# Patient Record
Sex: FEMALE | Race: OTHER | HISPANIC OR LATINO | Employment: UNEMPLOYED | URBAN - METROPOLITAN AREA
[De-identification: names, ages, dates, MRNs, and addresses within clinical notes are randomized per-mention and may not be internally consistent; named-entity substitution may affect disease eponyms.]

---

## 2019-01-05 ENCOUNTER — HOSPITAL ENCOUNTER (EMERGENCY)
Facility: HOSPITAL | Age: 19
Discharge: HOME | End: 2019-01-05
Attending: EMERGENCY MEDICINE
Payer: COMMERCIAL

## 2019-01-05 VITALS
RESPIRATION RATE: 16 BRPM | BODY MASS INDEX: 40.05 KG/M2 | SYSTOLIC BLOOD PRESSURE: 124 MMHG | WEIGHT: 204 LBS | HEIGHT: 60 IN | DIASTOLIC BLOOD PRESSURE: 68 MMHG | HEART RATE: 95 BPM | OXYGEN SATURATION: 99 % | TEMPERATURE: 97.1 F

## 2019-01-05 DIAGNOSIS — N39.0 UTI (URINARY TRACT INFECTION): Primary | ICD-10-CM

## 2019-01-05 LAB
BACTERIA UR QL AUTO: ABNORMAL /HPF
BILIRUB UR QL STRIP: NEGATIVE
CLARITY UR: CLEAR
COLOR UR: YELLOW
EXT PREG TEST URINE: NORMAL
GLUCOSE UR STRIP-MCNC: NEGATIVE MG/DL
HGB UR QL STRIP.AUTO: ABNORMAL
KETONES UR STRIP-MCNC: NEGATIVE MG/DL
LEUKOCYTE ESTERASE UR QL STRIP: ABNORMAL
NITRITE UR QL STRIP: NEGATIVE
NON-SQ EPI CELLS URNS QL MICRO: ABNORMAL /HPF
PH UR STRIP.AUTO: 6.5 [PH] (ref 4.5–8)
PROT UR STRIP-MCNC: ABNORMAL MG/DL
RBC #/AREA URNS AUTO: ABNORMAL /HPF
SP GR UR STRIP.AUTO: 1.01 (ref 1–1.03)
UROBILINOGEN UR QL STRIP.AUTO: 0.2 E.U./DL
WBC #/AREA URNS AUTO: ABNORMAL /HPF

## 2019-01-05 PROCEDURE — 87186 SC STD MICRODIL/AGAR DIL: CPT

## 2019-01-05 PROCEDURE — 87147 CULTURE TYPE IMMUNOLOGIC: CPT

## 2019-01-05 PROCEDURE — 81025 URINE PREGNANCY TEST: CPT | Performed by: EMERGENCY MEDICINE

## 2019-01-05 PROCEDURE — 87077 CULTURE AEROBIC IDENTIFY: CPT

## 2019-01-05 PROCEDURE — 87086 URINE CULTURE/COLONY COUNT: CPT

## 2019-01-05 PROCEDURE — 81001 URINALYSIS AUTO W/SCOPE: CPT

## 2019-01-05 PROCEDURE — 99283 EMERGENCY DEPT VISIT LOW MDM: CPT

## 2019-01-05 RX ORDER — CEPHALEXIN 500 MG/1
500 CAPSULE ORAL EVERY 12 HOURS SCHEDULED
Qty: 14 CAPSULE | Refills: 0 | Status: SHIPPED | OUTPATIENT
Start: 2019-01-05 | End: 2019-01-12

## 2019-01-05 NOTE — DISCHARGE INSTRUCTIONS
Infección del tracto urinario en mujeres   LO QUE NECESITA SABER:   Katerine infección en el tracto urinario (ITU) ocurre cuando entran bacterias en plasencia tracto urinario  La mayoría de las bacterias que entran al tracto urinario salen al Misti Pong  Si la bacteria permanece en el tracto urinario, usted podría contraer Pitney You  Plasencia tracto urinario incluye leopoldo riñones, uréteres, vejiga y Gondregnies  La orina es producida en los riñones y fluye del uréter a la vejiga  La orina sale de la vejiga a través de la uretra  Katerine infección del tracto urinario es más común in [place] parte inferior de plasencia tracto urinario que incluye plasencia vejiga y uretra  INSTRUCCIONES SOBRE EL VALENTIN HOSPITALARIA:   Regrese a la lilian de emergencias si:   · Usted está orinando muy poco o nada en absoluto  · Usted tiene fiebre valentin con temblor y escalofríos  · Usted tiene dolor en el costado o en la espalda que FORTUNATO  Pregúntele a plasencia Taisha Kate vitaminas y minerales son adecuados para usted  · Usted tiene fiebre  · Usted no siente mejoría después de 2 días de marce los antibióticos  · Usted esta vomitando  · Usted tiene preguntas o inquietudes acerca de plasencia condición o cuidado  Medicamentos:   · Antibióticos  ayudan a combatir katerine infección bacterial      · Medicamentos,  para disminuir el dolor y el ardor al orinar  También ayudarán a disminuir la sensación de necesitar orinar con frecuencia  Estos medicamentos harán que orine de color anaranjado o tate  · [place] leopoldo medicamentos bria se le haya indicado  Consulte con plasencia médico si usted migdalia que plasencia medicamento no le está ayudando o si presenta efectos secundarios  Infórmele si es alérgico a cualquier medicamento  Mantenga katerine lista actualizada de los [place], las vitaminas y los productos herbales que edvin  Incluya los siguientes datos de los medicamentos: cantidad, frecuencia y motivo de administración   Traiga con usted la lista o los envases de la píldoras a leopoldo citas de seguimiento  Lleve la lista de los medicamentos con usted en carmelo de dylon emergencia  Acuda a leopoldo consultas de control con plasencia médico según le indicaron  Anote leopoldo preguntas para que se acuerde de hacerlas cheikh leopoldo visitas  Evite otra ITU:   · Vacíe la vejiga con frecuencia  Orine y vacíe la vejiga tan pronto bria usted sienta la necesidad  No retenga la orina por largos períodos de [place]  · Límpiese de adelante hacia atrás después de orinar o de tener dylon evacuación intestinal   [place] ayudará a evitar que los gérmenes entren en el tracto urinario a través de la uretra  · [address] se le haya indicado  Pregunte cuánto líquido debe marce cada día y cuáles líquidos son los más adecuados para usted  Es probable que usted necesite marce más líquidos de lo habitual para ayudar a deshacerse de la bacteria  No tome alcohol, cafeína ni jugos cítricos  Estos pueden irritar plasencia vejiga y aumentar leopoldo síntomas  Plasencia médico puede recomendarle el jugo de arándano para prevenir dylon infección [place]  · Orine después de [employer]  [place] puede ayudar a eliminar las bacterias que pasan cheikh el sexo  · No tome duchas vaginales ni use desodorantes femeninos  Estos pueden cambiar el equilibrio químico de la vagina  · Cambie las compresas o los tampones a menudo  [place] ayudará a evitar que los gérmenes entren en el tracto urinario  · Realice ejercicios para el piso pélvico con frecuencia  Los músculos pélvicos ayudan a empezar y parar de orinar  Los músculos pélvicos kory ayudan a vaciar la vejiga más fácilmente  Apriete estos músculos firmemente cheikh 5 segundos bria si estuviera tratando de retener el flujo de [facility]  Luego relaje por 5 segundos  Aumente gradualmente a 10 segundos  Carmine 3 series de 15 repeticiones al día o bria se le indique    © 2017 [address] [employer] Information is for End User's use only and may not be sold, redistributed or otherwise used for commercial purposes  All illustrations and images included in CareNotes® are the copyrighted property of A D A M , Inc  or Johnny Dooley  Esta información es sólo para uso en educación  Meyer intención no es darle un consejo médico sobre enfermedades o tratamientos  Colsulte con meyer Escobar Hall farmacéutico antes de seguir cualquier régimen médico para saber si es seguro y efectivo para usted

## 2019-01-05 NOTE — ED PROVIDER NOTES
History  Chief Complaint   Patient presents with    Blood in Urine     pt to ER c/o blood in her urine  +burning with urination  Pt taking [facility] urine infection medication without relief  Symptoms started a few days ago       Urinary Frequency   Location:  Suprapubic  Quality:  Burning, frequency, hematuria  Severity:  Mild  Onset quality:  Gradual  Duration:  3 days  Timing:  Constant  Progression:  Unchanged  Chronicity:  New  Relieved by:  AZO  Worsened by:  Nothing  Associated symptoms: abdominal pain    Associated symptoms: no chest pain, no congestion, no cough, no ear pain, no fever, no headaches, no nausea, no shortness of breath, no sore throat, no vomiting and no wheezing        None       History reviewed  No pertinent past medical history  History reviewed  No pertinent surgical history  History reviewed  No pertinent family history  I have reviewed and agree with the history as documented  Social History   Substance Use Topics    Smoking status: Never Smoker    Smokeless tobacco: Never Used    Alcohol use No        Review of Systems   Constitutional: Negative for activity change, appetite change, chills and fever  HENT: Negative for congestion, ear pain and sore throat  Respiratory: Negative for cough, shortness of breath and wheezing  Cardiovascular: Negative for chest pain  Gastrointestinal: Positive for abdominal pain  Negative for nausea and vomiting  Genitourinary: Positive for dysuria and frequency  Negative for decreased urine volume, difficulty urinating, dyspareunia, flank pain, pelvic pain, urgency, vaginal bleeding, vaginal discharge and vaginal pain  Neurological: Negative for headaches  All other systems reviewed and are negative  Physical Exam  Physical Exam   Constitutional: She is oriented to person, place, and time  She appears well-developed and well-nourished  No distress  HENT:   Head: Normocephalic and atraumatic     Right Ear: External ear normal    Left Ear: External ear normal    Nose: Nose normal    Mouth/Throat: Oropharynx is clear and moist    Eyes: Pupils are equal, round, and reactive to light  Conjunctivae and EOM are normal  No scleral icterus  Neck: Normal range of motion  Neck supple  Cardiovascular: Normal rate, regular rhythm, normal heart sounds and intact distal pulses  Exam reveals no gallop and no friction rub  No murmur heard  Pulmonary/Chest: Effort normal and breath sounds normal  No respiratory distress  She has no wheezes  Abdominal: Soft  Bowel sounds are normal  She exhibits no distension  There is tenderness in the suprapubic area  There is no rigidity, no rebound, no guarding and no CVA tenderness  Musculoskeletal: Normal range of motion  She exhibits no edema, tenderness or deformity  Lymphadenopathy:     She has no cervical adenopathy  Neurological: She is alert and oriented to person, place, and time  Skin: Skin is warm and dry  Capillary refill takes less than 2 seconds  No rash noted  She is not diaphoretic  No erythema  Nursing note and vitals reviewed        Vital Signs  ED Triage Vitals [01/05/19 1740]   Temperature Pulse Respirations Blood Pressure SpO2   (!) 97 1 °F (36 2 °C) 95 16 124/68 99 %      Temp Source Heart Rate Source Patient Position - Orthostatic VS BP Location FiO2 (%)   Temporal Monitor Sitting Right arm --      Pain Score       Worst Possible Pain           Vitals:    01/05/19 1740   BP: 124/68   Pulse: 95   Patient Position - Orthostatic VS: Sitting       Visual Acuity      ED Medications  Medications - No data to display    Diagnostic Studies  Results Reviewed     Procedure Component Value Units Date/Time    Urine Microscopic [[id number]]  (Abnormal) Collected:  01/05/19 1808    Lab Status:  Final result Specimen:  Urine from Urine, Clean Catch Updated:  01/05/19 1818     RBC, UA Innumerable (A) /hpf      WBC, UA Innumerable (A) /hpf      Epithelial Cells Occasional /hpf Bacteria, UA Occasional /hpf     Urine culture [[id number]] Collected:  01/05/19 1808    Lab Status: In process Specimen:  Urine from Urine, Clean Catch Updated:  01/05/19 1818    POCT pregnancy, urine [[id number]]  (Normal) Resulted:  01/05/19 1753    Lab Status:  Final result Updated:  01/05/19 1753     EXT PREG TEST UR (Ref: Negative) neg    POCT urinalysis dipstick [[id number]]  (Abnormal) Resulted:  01/05/19 1753    Lab Status:  Final result Updated:  01/05/19 1753    ED Urine Macroscopic [[id number]]  (Abnormal) Collected:  01/05/19 1808    Lab Status:  Final result Specimen:  Urine Updated:  01/05/19 1752     Color, UA Yellow     Clarity, UA Clear     pH, UA 6 5     Leukocytes, UA Large (A)     Nitrite, UA Negative     Protein,  (2+) (A) mg/dl      Glucose, UA Negative mg/dl      Ketones, UA Negative mg/dl      Urobilinogen, UA 0 2 E U /dl      Bilirubin, UA Negative     Blood, UA Large (A)     Specific [place], UA 1 015    Narrative:       CLINITEK RESULT                 No orders to display              Procedures  Procedures       Phone Contacts  ED Phone Contact    ED Course                               MDM  Number of Diagnoses or Management Options  UTI (urinary tract infection): new and requires workup  Diagnosis management comments: SUBJECTIVE: Korin Alvarado is a 22 y o  female who complains of urinary frequency, urgency and dysuria x 3 days, without flank pain, fever, chills, or abnormal vaginal discharge or bleeding  OBJECTIVE: Appears well, in no apparent distress  Vital signs are normal  The abdomen is soft with mild suprapubic tenderness; no guarding, mass, rebound or organomegaly  No CVA tenderness or inguinal adenopathy noted  Urine dipstick shows positive for WBC's, positive for RBC's and positive for leukocytes  Micro exam: innumerable WBC's per HPF, innumberable RBC's per HPF and occasional bacteria       ASSESSMENT: UTI uncomplicated without evidence of pyelonephritis    PLAN: Treatment per orders - also push fluids, continue  Pyridium OTC prn  Call or return to ED prn if these symptoms worsen or fail to improve as anticipated  Amount and/or Complexity of Data Reviewed  Clinical lab tests: ordered and reviewed    Risk of Complications, Morbidity, and/or Mortality  Presenting problems: low  Diagnostic procedures: low  Management options: low    Patient Progress  Patient progress: stable    CritCare Time    Disposition  Final diagnoses:   UTI (urinary tract infection)     Time reflects when diagnosis was documented in both MDM as applicable and the Disposition within this note     Time User Action Codes Description Comment    1/5/2019  6:27 PM Adrianna Stewart Add [N39 0] UTI (urinary tract infection)       ED Disposition     ED Disposition Condition Comment    Discharge  Felecia Mcrae discharge to home/self care      Condition at discharge: Stable        Follow-up Information     Follow up With Specialties Details Why [address] [place] [address] Family Medicine   [address] [place] [address]  [phone]  [place] [address] Obstetrics and Gynecology Schedule an appointment as soon as possible for a visit As needed [facility] [phone]  [address], [address], [place], [place], [address] [facility]  [facility] Emergency Medicine  If symptoms worsen Connie Tolentino [address] [place] ED, [address] [facility] , [place], [place], [address]          Patient's Medications   Discharge Prescriptions    CEPHALEXIN (KEFLEX) 500 MG CAPSULE    Take 1 capsule (500 mg total) by mouth every 12 (twelve) hours for 7 days       Start Date: 1/5/2019  End Date: 1/12/2019       Order Dose: 500 mg       Quantity: 14 capsule    Refills: 0     No discharge procedures on file      ED Provider  Electronically Signed by           Hailee Huggins PA-C  01/05/19 6638

## 2019-01-08 LAB
BACTERIA UR CULT: ABNORMAL
BACTERIA UR CULT: ABNORMAL

## 2020-01-04 ENCOUNTER — HOSPITAL ENCOUNTER (EMERGENCY)
Facility: HOSPITAL | Age: 20
Discharge: HOME | End: 2020-01-04
Attending: EMERGENCY MEDICINE | Admitting: EMERGENCY MEDICINE
Payer: COMMERCIAL

## 2020-01-04 VITALS
SYSTOLIC BLOOD PRESSURE: 104 MMHG | RESPIRATION RATE: 18 BRPM | HEART RATE: 86 BPM | WEIGHT: 209.44 LBS | BODY MASS INDEX: 40.9 KG/M2 | OXYGEN SATURATION: 98 % | DIASTOLIC BLOOD PRESSURE: 68 MMHG | TEMPERATURE: 100.1 F

## 2020-01-04 DIAGNOSIS — J10.1 INFLUENZA A: Primary | ICD-10-CM

## 2020-01-04 LAB
BACTERIA UR QL AUTO: ABNORMAL /HPF
BILIRUB UR QL STRIP: NEGATIVE
CLARITY UR: CLEAR
CLARITY, POC: CLEAR
COLOR UR: YELLOW
COLOR, POC: YELLOW
EXT PREG TEST URINE: NEGATIVE
EXT. CONTROL ED NAV: NORMAL
FLUAV RNA NPH QL NAA+PROBE: DETECTED
FLUBV RNA NPH QL NAA+PROBE: ABNORMAL
GLUCOSE UR STRIP-MCNC: NEGATIVE MG/DL
HGB UR QL STRIP.AUTO: ABNORMAL
KETONES UR STRIP-MCNC: NEGATIVE MG/DL
LEUKOCYTE ESTERASE UR QL STRIP: NEGATIVE
NITRITE UR QL STRIP: NEGATIVE
NON-SQ EPI CELLS URNS QL MICRO: ABNORMAL /HPF
PH UR STRIP.AUTO: 6 [PH] (ref 4.5–8)
PROT UR STRIP-MCNC: ABNORMAL MG/DL
RBC #/AREA URNS AUTO: ABNORMAL /HPF
RSV RNA NPH QL NAA+PROBE: ABNORMAL
SP GR UR STRIP.AUTO: 1.02 (ref 1–1.03)
UROBILINOGEN UR QL STRIP.AUTO: 0.2 E.U./DL
WBC #/AREA URNS AUTO: ABNORMAL /HPF

## 2020-01-04 PROCEDURE — 81025 URINE PREGNANCY TEST: CPT | Performed by: EMERGENCY MEDICINE

## 2020-01-04 PROCEDURE — 99283 EMERGENCY DEPT VISIT LOW MDM: CPT | Performed by: NURSE PRACTITIONER

## 2020-01-04 PROCEDURE — 96360 HYDRATION IV INFUSION INIT: CPT

## 2020-01-04 PROCEDURE — 81001 URINALYSIS AUTO W/SCOPE: CPT

## 2020-01-04 PROCEDURE — 99283 EMERGENCY DEPT VISIT LOW MDM: CPT

## 2020-01-04 PROCEDURE — 87631 RESP VIRUS 3-5 TARGETS: CPT | Performed by: NURSE PRACTITIONER

## 2020-01-04 RX ORDER — IBUPROFEN 400 MG/1
400 TABLET ORAL ONCE
Status: COMPLETED | OUTPATIENT
Start: 2020-01-04 | End: 2020-01-04

## 2020-01-04 RX ORDER — ACETAMINOPHEN 325 MG/1
650 TABLET ORAL ONCE
Status: COMPLETED | OUTPATIENT
Start: 2020-01-04 | End: 2020-01-04

## 2020-01-04 RX ORDER — OSELTAMIVIR PHOSPHATE 75 MG/1
75 CAPSULE ORAL EVERY 12 HOURS
Qty: 10 CAPSULE | Refills: 0 | Status: SHIPPED | OUTPATIENT
Start: 2020-01-04 | End: 2020-01-09

## 2020-01-04 RX ADMIN — IBUPROFEN 400 MG: 400 TABLET ORAL at 02:29

## 2020-01-04 RX ADMIN — ACETAMINOPHEN 650 MG: 325 TABLET ORAL at 00:37

## 2020-01-04 RX ADMIN — SODIUM CHLORIDE 1000 ML: 0.9 INJECTION, SOLUTION INTRAVENOUS at 02:39

## 2020-01-04 NOTE — ED PROVIDER NOTES
History  Chief Complaint   Patient presents with    Generalized Body Aches     Generalized bodyaches, fatigue, fevers, weakness, headaches, prior nausea/vomiting  Accompanied by friend who is also sick  No antipyretics pta  This is an obese 20year old [address] speaking female who is here from [address] visiting and states that yesterday developed a fever, body aches, weakness, headaches  She states she had nausea and vomiting earlier  She denies taking anything for her symptoms  She denies getting a flu shot  Her friend is ill as well  History provided by:  Patient and medical records   used: Yes        None       History reviewed  No pertinent past medical history  History reviewed  No pertinent surgical history  History reviewed  No pertinent family history  I have reviewed and agree with the history as documented  Social History     Tobacco Use    Smoking status: Never Smoker    Smokeless tobacco: Never Used   Substance Use Topics    Alcohol use: No    Drug use: No        Review of Systems   Constitutional: Positive for fatigue and fever  HENT: Negative  Eyes: Negative  Respiratory: Positive for cough  Cardiovascular: Negative  Gastrointestinal: Positive for nausea  Endocrine: Negative  Genitourinary: Negative  Musculoskeletal: Positive for myalgias  Skin: Negative  Allergic/Immunologic: Negative  Neurological: Positive for headaches  Hematological: Negative  Psychiatric/Behavioral: Negative  Physical Exam  Physical Exam   Constitutional: She is oriented to person, place, and time  She appears well-developed and well-nourished  She appears distressed  Appears not to feel well but is not toxic appearing     HENT:   Head: Normocephalic  Right Ear: External ear normal    Left Ear: External ear normal    Nose: Nose normal    Mouth/Throat: Oropharynx is clear and moist  No oropharyngeal exudate     Eyes: Pupils are equal, round, and reactive to light  EOM are normal    Neck: Normal range of motion  Neck supple  Cardiovascular: Normal rate, regular rhythm and normal heart sounds  Pulmonary/Chest: Effort normal and breath sounds normal    Abdominal: Soft  Bowel sounds are normal  She exhibits no distension  There is no tenderness  Musculoskeletal: Normal range of motion  Neurological: She is alert and oriented to person, place, and time  Skin: Skin is warm  She is diaphoretic  Psychiatric: She has a normal mood and affect  Her behavior is normal  Judgment and thought content normal    Nursing note and vitals reviewed        Vital Signs  ED Triage Vitals [01/04/20 0034]   Temperature Pulse Respirations Blood Pressure SpO2   (!) 102 8 °F (39 3 °C) (!) 126 18 116/75 96 %      Temp Source Heart Rate Source Patient Position - Orthostatic VS BP Location FiO2 (%)   Oral Monitor Sitting Right arm --      Pain Score       8           Vitals:    01/04/20 0034 01/04/20 0239   BP: 116/75 104/68   Pulse: (!) 126 86   Patient Position - Orthostatic VS: Sitting          Visual Acuity      ED Medications  Medications   acetaminophen (TYLENOL) tablet 650 mg (650 mg Oral Given 1/4/20 0037)   sodium chloride 0 9 % bolus 1,000 mL (0 mL Intravenous Stopped 1/4/20 0332)   ibuprofen (MOTRIN) tablet 400 mg (400 mg Oral Given 1/4/20 0229)       Diagnostic Studies  Results Reviewed     Procedure Component Value Units Date/Time    Influenza A/B and RSV PCR [[id number]]  (Abnormal) Collected:  01/04/20 0226    Lab Status:  Final result Specimen:  Nasopharyngeal Swab Updated:  01/04/20 0319     INFLUENZA A PCR Detected     INFLUENZA B PCR None Detected     RSV PCR None Detected    Urine Microscopic [[id number]]  (Abnormal) Collected:  01/04/20 0152    Lab Status:  Final result Specimen:  Urine, Clean Catch Updated:  01/04/20 0300     RBC, UA 2-4 /hpf      WBC, UA None Seen /hpf      Epithelial Cells Occasional /hpf      Bacteria, UA Occasional /hpf     POCT urinalysis dipstick [[id number]]  (Normal) Resulted:  01/04/20 0201    Lab Status:  Final result Updated:  01/04/20 0202     Color, UA yellow     Clarity, UA clear    POCT pregnancy, urine [[id number]]  (Normal) Resulted:  01/04/20 0200    Lab Status:  Final result Updated:  01/04/20 0201     EXT PREG TEST UR (Ref: Negative) negative     Control valid    Urine Macroscopic, POC [[id number]]  (Abnormal) Collected:  01/04/20 0152    Lab Status:  Final result Specimen:  Urine Updated:  01/04/20 0153     Color, UA Yellow     Clarity, UA Clear     pH, UA 6 0     Leukocytes, UA Negative     Nitrite, UA Negative     Protein, UA Trace mg/dl      Glucose, UA Negative mg/dl      Ketones, UA Negative mg/dl      Urobilinogen, UA 0 2 E U /dl      Bilirubin, UA Negative     Blood, UA Small     Specific [place], UA 1 020    Narrative:       CLINITEK RESULT                 No orders to display              Procedures  Procedures         ED Course  ED Course as of Jan 04 0333   Sat Jan 04, 2020   0321 Influenza A  Urine negative for infection  3480 I have discussed all results with pt  I have educated on the side effects to tamiflu and told pt I would give her the prescription and she may make the choice to take or not  Educated pt on flu treatment, symptoms    Pt verbalizes understanding of dc instructions and follow up          /68   Pulse 86   Temp 100 1 °F (37 8 °C) (Oral)   Resp 18   Wt 95 kg (209 lb 7 oz)   LMP 12/13/2019   SpO2 98%   BMI 40 90 kg/m²                             MDM  Number of Diagnoses or Management Options  Diagnosis management comments: Flu like symptoms    Plan  ua  uahcg  IVF  Tylenol motrin  Flu swab        Amount and/or Complexity of Data Reviewed  Clinical lab tests: ordered and reviewed  Review and summarize past medical records: yes          Disposition  Final diagnoses:   Influenza A     Time reflects when diagnosis was documented in both MDM as applicable and the Disposition within this note     Time User Action Codes Description Comment    1/4/2020  3:29 AM Laly Mas Add [J10 1] Influenza A       ED Disposition     ED Disposition Condition Date/Time Comment    Discharge Stable Sat Jan 4, 2020  3:29 AM Juanis Cuevas discharge to home/self care  Follow-up Information     Follow up With Specialties Details Why Contact Info Additional [address] [facility] Emergency Department Emergency Medicine  If symptoms worsen [facility] [address]  [phone] [place] ED, [address] [facility] [place], [place], [address]          Patient's Medications   Discharge Prescriptions    OSELTAMIVIR (TAMIFLU) 75 MG CAPSULE    Take 1 capsule (75 mg total) by mouth every 12 (twelve) hours for 5 days       Start Date: 1/4/2020  End Date: 1/9/2020       Order Dose: 75 mg       Quantity: 10 capsule    Refills: 0     No discharge procedures on file      ED Provider  Electronically Signed by           Sanford Kennedy  01/04/20 6670

## 2020-01-04 NOTE — DISCHARGE INSTRUCTIONS
You have the flu  You are to take tylenol every 4-6 hours and motrin every 6-8 hours as needed for fever or pain  Increase water/fluid intake  You are contagious - wear a  mask  You will feel ill for at least 7 days  Follow up with your PCp when you get home  You were given a prescription for tamiflu - I have reviewed the side effects and benefits - the choice to take is yours

## 2022-08-16 ENCOUNTER — HOSPITAL ENCOUNTER (EMERGENCY)
Facility: HOSPITAL | Age: 22
Discharge: HOME | End: 2022-08-16
Attending: EMERGENCY MEDICINE
Payer: COMMERCIAL

## 2022-08-16 VITALS
SYSTOLIC BLOOD PRESSURE: 123 MMHG | DIASTOLIC BLOOD PRESSURE: 73 MMHG | TEMPERATURE: 98.4 F | RESPIRATION RATE: 18 BRPM | HEART RATE: 101 BPM | OXYGEN SATURATION: 100 %

## 2022-08-16 DIAGNOSIS — N89.8 VAGINAL DISCHARGE: Primary | ICD-10-CM

## 2022-08-16 DIAGNOSIS — N76.0 BV (BACTERIAL VAGINOSIS): Primary | ICD-10-CM

## 2022-08-16 DIAGNOSIS — B96.89 BV (BACTERIAL VAGINOSIS): Primary | ICD-10-CM

## 2022-08-16 LAB
BACTERIA UR QL AUTO: ABNORMAL /HPF
BILIRUB UR QL STRIP: NEGATIVE
C GLABRATA DNA VAG QL NAA+PROBE: NEGATIVE
C KRUSEI DNA VAG QL NAA+PROBE: NEGATIVE
C TRACH DNA SPEC QL NAA+PROBE: NEGATIVE
CANDIDA SP 6 PNL VAG NAA+PROBE: NEGATIVE
CLARITY UR: ABNORMAL
COLOR UR: YELLOW
EXT PREG TEST URINE: NEGATIVE
EXT. CONTROL ED NAV: NORMAL
GLUCOSE UR STRIP-MCNC: NEGATIVE MG/DL
HGB UR QL STRIP.AUTO: ABNORMAL
KETONES UR STRIP-MCNC: NEGATIVE MG/DL
LEUKOCYTE ESTERASE UR QL STRIP: NEGATIVE
MUCOUS THREADS UR QL AUTO: ABNORMAL
N GONORRHOEA DNA SPEC QL NAA+PROBE: NEGATIVE
NITRITE UR QL STRIP: NEGATIVE
NON-SQ EPI CELLS URNS QL MICRO: ABNORMAL /HPF
PH UR STRIP.AUTO: 5.5 [PH]
PROT UR STRIP-MCNC: ABNORMAL MG/DL
RBC #/AREA URNS AUTO: ABNORMAL /HPF
SP GR UR STRIP.AUTO: >=1.03 (ref 1–1.03)
T VAGINALIS DNA VAG QL NAA+PROBE: NEGATIVE
UROBILINOGEN UR QL STRIP.AUTO: 0.2 E.U./DL
VAGINOSIS/ITIS DNA PNL VAG PROBE+SIG AMP: POSITIVE
WBC #/AREA URNS AUTO: ABNORMAL /HPF

## 2022-08-16 PROCEDURE — 99283 EMERGENCY DEPT VISIT LOW MDM: CPT

## 2022-08-16 PROCEDURE — 81514 NFCT DS BV&VAGINITIS DNA ALG: CPT

## 2022-08-16 PROCEDURE — 81025 URINE PREGNANCY TEST: CPT

## 2022-08-16 PROCEDURE — 99284 EMERGENCY DEPT VISIT MOD MDM: CPT

## 2022-08-16 PROCEDURE — 81001 URINALYSIS AUTO W/SCOPE: CPT

## 2022-08-16 PROCEDURE — 96372 THER/PROPH/DIAG INJ SC/IM: CPT

## 2022-08-16 PROCEDURE — 87591 N.GONORRHOEAE DNA AMP PROB: CPT

## 2022-08-16 PROCEDURE — 87491 CHLMYD TRACH DNA AMP PROBE: CPT

## 2022-08-16 RX ORDER — METRONIDAZOLE 500 MG/1
500 TABLET ORAL EVERY 12 HOURS SCHEDULED
Qty: 14 TABLET | Refills: 0 | Status: SHIPPED | OUTPATIENT
Start: 2022-08-16 | End: 2022-08-23

## 2022-08-16 RX ORDER — KETOROLAC TROMETHAMINE 30 MG/ML
15 INJECTION, SOLUTION INTRAMUSCULAR; INTRAVENOUS ONCE
Status: DISCONTINUED | OUTPATIENT
Start: 2022-08-16 | End: 2022-08-16

## 2022-08-16 RX ORDER — KETOROLAC TROMETHAMINE 30 MG/ML
15 INJECTION, SOLUTION INTRAMUSCULAR; INTRAVENOUS ONCE
Status: COMPLETED | OUTPATIENT
Start: 2022-08-16 | End: 2022-08-16

## 2022-08-16 RX ADMIN — KETOROLAC TROMETHAMINE 15 MG: 30 INJECTION, SOLUTION INTRAMUSCULAR at 04:50

## 2022-08-16 NOTE — DISCHARGE INSTRUCTIONS
If gonorrhea, chlamydia, Trichomonas, yeast, or bacterial vaginosis is positive, we will call with results and send any medications to the pharmacy that are needed    Si la gonorrea, la clamidia, las tricomonas, la levadura o la vaginosis bacteriana son positivas, llamaremos con los resultados y enviaremos a la farmacia los medicamentos que melani necesarios      Tylenol e ibuprofeno para el dolor    Regrese a la lilian de emergencias si [employer], la fiebre, los escalofríos o cualquier otro síntoma nuevo o preocupante

## 2022-08-16 NOTE — Clinical Note
Alexis Myrick was seen and treated in our emergency department on 8/16/2022  Diagnosis:     [place]    She may return on this date: 08/17/2022    Please excuse for 8/15, 8/16     If you have any questions or concerns, please don't hesitate to call        Meghan Cali PA-C    ______________________________           _______________          _______________  Hospital Representative                              Date                                Time Problem: Impaired Functional Mobility  Goal: Achieve highest/safest level of mobility/gait  Description  Interventions:  - Assess patient's functional ability and stability  - Promote increasing activity/tolerance for mobility and gait  - Educate and eng

## 2022-08-16 NOTE — ED PROVIDER NOTES
History  Chief Complaint   Patient presents with    Vaginal Discharge     Pt reports lmp was June 20th  -preg test at home two days ago  Pt c/o "spotting" and foul smelling vaginal discharge  The patient is a 79-year-old female with no significant medical history, visiting from [facility], who presents to the ED for evaluation of a 10 day history of vaginal discharge  She describes this is vaginal bleeding, described as spotting, mixed with a thick vaginal discharge  Does report despite risks, denies pruritus  Denies history of similar discharge  Patient is LMP was June 20th, and she took a pregnancy test at home which was -2 days ago  She is sexually active, does not use birth control  Has been with the same partner for significant amount of time; is not significantly concerned about STI  She also reports lower abdominal cramping  She reports that she has had similar cramping for several years, and that it is not located in one specific area, however moves around the lower abdomen  Nothing alleviates or exacerbates pain  She otherwise denies fever, chills, back pain, nausea, vomiting, chest pain, dyspnea, diarrhea, melena, hematochezia, dysuria, hematuria  Does have constipation at baseline  HPI was obtained using  services  None       History reviewed  No pertinent past medical history  History reviewed  No pertinent surgical history  History reviewed  No pertinent family history  I have reviewed and agree with the history as documented  E-Cigarette/Vaping     E-Cigarette/Vaping Substances     Social History     Tobacco Use    Smoking status: Never Smoker    Smokeless tobacco: Never Used   Substance Use Topics    Alcohol use: No    Drug use: No       Review of Systems   Constitutional: Negative for chills and fever  HENT: Negative for congestion and rhinorrhea  Respiratory: Negative for cough and shortness of breath      Cardiovascular: Negative for chest pain and leg swelling  Gastrointestinal: Positive for abdominal pain (Lower abdominal cramping)  Negative for constipation, diarrhea, nausea and vomiting  Genitourinary: Positive for vaginal bleeding and vaginal discharge  Negative for difficulty urinating, dyspareunia, dysuria, flank pain and hematuria  Musculoskeletal: Negative for arthralgias and myalgias  Skin: Negative for rash and wound  Neurological: Negative for dizziness, weakness, numbness and headaches  Psychiatric/Behavioral: Negative for behavioral problems  Physical Exam  Physical Exam  Vitals and nursing note reviewed  Constitutional:       General: She is not in acute distress  Appearance: She is well-developed  She is not ill-appearing or toxic-appearing  HENT:      Head: Normocephalic and atraumatic  Mouth/Throat:      Mouth: Mucous membranes are moist    Eyes:      Conjunctiva/sclera: Conjunctivae normal    Cardiovascular:      Rate and Rhythm: Normal rate and regular rhythm  Heart sounds: No murmur heard  Pulmonary:      Effort: Pulmonary effort is normal  No respiratory distress  Breath sounds: Normal breath sounds  Abdominal:      Palpations: Abdomen is soft  Tenderness: There is no abdominal tenderness  There is no right CVA tenderness, left CVA tenderness, guarding or rebound  Musculoskeletal:         General: Normal range of motion  Cervical back: Neck supple  Skin:     General: Skin is warm and dry  Neurological:      Mental Status: She is alert           Vital Signs  ED Triage Vitals   Temperature Pulse Respirations Blood Pressure SpO2   08/16/22 0321 08/16/22 0315 08/16/22 0315 08/16/22 0315 08/16/22 0315   98 4 °F (36 9 °C) 101 18 123/73 100 %      Temp Source Heart Rate Source Patient Position - Orthostatic VS BP Location FiO2 (%)   08/16/22 0321 08/16/22 0315 -- 08/16/22 0315 --   Oral Monitor  Left arm       Pain Score       08/16/22 0450       5           Vitals:    08/16/22 0315   BP: 123/73   Pulse: 101         Visual Acuity      ED Medications  Medications   ketorolac (TORADOL) injection 15 mg (15 mg Intramuscular Given 8/16/22 0450)       Diagnostic Studies  Results Reviewed     Procedure Component Value Units Date/Time    Urine Microscopic [[id number]]  (Abnormal) Collected: 08/16/22 0414    Lab Status: Final result Specimen: Urine, Clean Catch Updated: 08/16/22 0456     RBC, UA 20-30 /hpf      WBC, UA 4-10 /hpf      Epithelial Cells Moderate /hpf      Bacteria, UA Occasional /hpf      MUCUS THREADS Occasional    UA w Reflex to Microscopic w Reflex to Culture [[id number]]  (Abnormal) Collected: 08/16/22 0414    Lab Status: Final result Specimen: Urine, Clean Catch Updated: 08/16/22 0455     Color, UA Yellow     Clarity, UA Slightly Cloudy     Specific Gravity, UA >=1 030     pH, UA 5 5     Leukocytes, UA Negative     Nitrite, UA Negative     Protein, UA Trace mg/dl      Glucose, UA Negative mg/dl      Ketones, UA Negative mg/dl      Urobilinogen, UA 0 2 E U /dl      Bilirubin, UA Negative     Occult Blood, UA Large    Molecular Vaginal Panel [[id number]] Collected: 08/16/22 0435    Lab Status: In process Specimen: Genital from Vaginal Updated: 08/16/22 0448    POCT pregnancy, urine [[id number]]  (Normal) Resulted: 08/16/22 0435    Lab Status: Final result Updated: 08/16/22 0435     EXT PREG TEST UR (Ref: Negative) NEGATIVE     Control VALID    Chlamydia/GC amplified DNA by PCR [[id number]] Collected: 08/16/22 0414    Lab Status:  In process Specimen: Urine, Other Updated: 08/16/22 0424                 No orders to display              Procedures  Procedures         ED Course  ED Course as of 08/16/22 0522   Tue Aug 16, 2022   0437 PREGNANCY TEST URINE: NEGATIVE     MDM  Number of Diagnoses or Management Options  Vaginal discharge: new and requires workup     Amount and/or Complexity of Data Reviewed  Clinical lab tests: ordered and reviewed  Tests in the medicine section of CPT®: ordered and reviewed  Decide to obtain previous medical records or to obtain history from someone other than the patient: yes  Review and summarize past medical records: yes    Risk of Complications, Morbidity, and/or Mortality  Presenting problems: low  Management options: low    Patient Progress  Patient progress: improved    The patient is a 41-year-old female with no significant medical history, visiting from [facility], who presents to the ED for evaluation of a 10 day history of vaginal discharge with associated lower abdominal cramping that is intermittent and has been present for several years  She otherwise denies fever, chills, back pain, nausea, vomiting, chest pain, dyspnea, diarrhea, melena, hematochezia, dysuria, hematuria  LMP 6/20/22    On exam, patient is in no acute distress  Abdomen soft and nontender, no CVA tenderness  Vital signs stable; heart rate in 80s on my exam     Will obtain UA, U preg, molecular vaginal panel, gonorrhea/chlamydia  Findings reviewed  I discussed with patient and significant other at side that we will call with any positive results and send any necessary medications to the pharmacy  They verbalized agreement and understanding  Following Toradol, patient reports significant improvement in pain  At the time of discharge, the patient is in no acute distress  I discussed with the patient the diagnosis, treatment plan, follow-up, return precautions, and discharge instructions; they were given the opportunity to ask questions and verbalized understanding      Disposition  Final diagnoses:   Vaginal discharge     Time reflects when diagnosis was documented in both MDM as applicable and the Disposition within this note     Time User Action Codes Description Comment    8/16/2022  5:18 AM Malathi Jaime Add [N89 [id number]] Vaginal discharge       ED Disposition     ED Disposition   Discharge    Condition   Stable    Date/Time   Tue Aug 16, 2022  5:18 AM    Comment   Kristi Keane Faith discharge to home/self care  Follow-up Information    None         Patient's Medications    No medications on file       No discharge procedures on file      PDMP Review     None          ED Provider  Electronically Signed by           Ricky Tomlin PA-C  08/16/22 5185

## 2022-08-16 NOTE — ED NOTES
Discharge instructions reviewed with patient by provider  Verbalized understanding with no further questions       Sherron Pearson RN  08/16/22 0940
